# Patient Record
Sex: FEMALE | Race: WHITE | Employment: FULL TIME | URBAN - METROPOLITAN AREA
[De-identification: names, ages, dates, MRNs, and addresses within clinical notes are randomized per-mention and may not be internally consistent; named-entity substitution may affect disease eponyms.]

---

## 2018-11-19 ENCOUNTER — OFFICE VISIT (OUTPATIENT)
Dept: NEUROLOGY | Age: 23
End: 2018-11-19

## 2018-11-19 VITALS
SYSTOLIC BLOOD PRESSURE: 118 MMHG | DIASTOLIC BLOOD PRESSURE: 76 MMHG | BODY MASS INDEX: 22.66 KG/M2 | HEIGHT: 61 IN | WEIGHT: 120 LBS | HEART RATE: 81 BPM | OXYGEN SATURATION: 98 %

## 2018-11-19 DIAGNOSIS — R29.2 HYPERREFLEXIA OF LOWER EXTREMITY: Primary | ICD-10-CM

## 2018-11-19 RX ORDER — PAROXETINE 10 MG/1
TABLET, FILM COATED ORAL
Qty: 30 TAB | Refills: 5 | Status: SHIPPED | OUTPATIENT
Start: 2018-11-19 | End: 2019-10-10

## 2018-11-19 NOTE — PATIENT INSTRUCTIONS
Call us in 30 days and let us know how you are doing A Healthy Lifestyle: Care Instructions Your Care Instructions A healthy lifestyle can help you feel good, stay at a healthy weight, and have plenty of energy for both work and play. A healthy lifestyle is something you can share with your whole family. A healthy lifestyle also can lower your risk for serious health problems, such as high blood pressure, heart disease, and diabetes. You can follow a few steps listed below to improve your health and the health of your family. Follow-up care is a key part of your treatment and safety. Be sure to make and go to all appointments, and call your doctor if you are having problems. It's also a good idea to know your test results and keep a list of the medicines you take. How can you care for yourself at home? · Do not eat too much sugar, fat, or fast foods. You can still have dessert and treats now and then. The goal is moderation. · Start small to improve your eating habits. Pay attention to portion sizes, drink less juice and soda pop, and eat more fruits and vegetables. ? Eat a healthy amount of food. A 3-ounce serving of meat, for example, is about the size of a deck of cards. Fill the rest of your plate with vegetables and whole grains. ? Limit the amount of soda and sports drinks you have every day. Drink more water when you are thirsty. ? Eat at least 5 servings of fruits and vegetables every day. It may seem like a lot, but it is not hard to reach this goal. A serving or helping is 1 piece of fruit, 1 cup of vegetables, or 2 cups of leafy, raw vegetables. Have an apple or some carrot sticks as an afternoon snack instead of a candy bar. Try to have fruits and/or vegetables at every meal. 
· Make exercise part of your daily routine. You may want to start with simple activities, such as walking, bicycling, or slow swimming.  Try to be active 30 to 60 minutes every day. You do not need to do all 30 to 60 minutes all at once. For example, you can exercise 3 times a day for 10 or 20 minutes. Moderate exercise is safe for most people, but it is always a good idea to talk to your doctor before starting an exercise program. 
· Keep moving. Wilfred Wendi the lawn, work in the garden, or [employer] [place]. Take the stairs instead of the elevator at work. · If you smoke, quit. People who smoke have an increased risk for heart attack, stroke, cancer, and other lung illnesses. Quitting is hard, but there are ways to boost your chance of quitting tobacco for good. ? Use nicotine gum, patches, or lozenges. ? Ask your doctor about stop-smoking programs and medicines. ? Keep trying. In addition to reducing your risk of diseases in the future, you will notice some benefits soon after you stop using tobacco. If you have shortness of breath or asthma symptoms, they will likely get better within a few weeks after you quit. · Limit how much alcohol you drink. Moderate amounts of alcohol (up to 2 drinks a day for men, 1 drink a day for women) are okay. But drinking too much can lead to liver problems, high blood pressure, and other health problems. Family health If you have a family, there are many things you can do together to improve your health. · Eat meals together as a family as often as possible. · Eat healthy foods. This includes fruits, vegetables, lean meats and dairy, and whole grains. · Include your family in your fitness plan. Most people think of activities such as jogging or tennis as the way to fitness, but there are many ways you and your family can be more active. Anything that makes you breathe hard and gets your heart pumping is exercise. Here are some tips: 
? Walk to do errands or to take your child to school or the bus. 
? Go for a family bike ride after dinner instead of watching TV. Where can you learn more? Go to http://cuba-navdeep.info/. Enter R529 in the search box to learn more about \"A Healthy Lifestyle: Care Instructions. \" Current as of: December 7, 2017 Content Version: 11.8 © 4844-7357 [employer]. Care instructions adapted under license by [employer] (which disclaims liability or warranty for this information). If you have questions about a medical condition or this instruction, always ask your healthcare professional. [address] any warranty or liability for your use of this information. Office Policies 
o Phone calls/patient messages: Please allow up to 24 hours for someone in the office to contact you about your call or message. Be mindful your provider may be out of the office or your message may require further review. We encourage you to use [employer] for your messages as this is a faster, more efficient way to communicate with our officeo Medication Refills: 
Prescription medications require up to 48 business hours to process. We encourage you to use [employer] for your refills. For controlled medications: Please allow up to 72 business hours to process. Certain medications may require you to  a written prescription at our office. NO narcotic/controlled medications will be prescribed after 4pm Monday through Friday or on weekendso Form/Paperwork Completion: 
Please note there is a $25 fee for all paperwork completed by our providers. We ask that you allow 7-14 business days. Pre-payment is due prior to picking up/faxing the completed form. You may also download your forms to [employer] to have your doctor print off. 
o Test Results: In order for a patient to obtain test results, an appointment must be made with the physician to review the results. Test results cannot be discussed over the phone.

## 2018-11-19 NOTE — PROGRESS NOTES
HISTORY OF PRESENT ILLNESS Bam Hernandez is a 26 y.o. female. This patient is a 82-year-old right-handed  female who comes in today for numbness and tingling on the left side of her body. She states this been going on for several months. She does admit to being an anxious person. She has a small child. She denies any bowel or bladder complaints. She denies any visual complaints. The numbness and tingling can be on the left side of her face or the left arm or the left leg. She denies any episodes of visual loss. She states she occasionally has some chest pain. She is on oral contraceptives. She does have a history of an anxiety disorder for about 10 years but currently is not taking any medication for it. She also has migraine but does not take any prescription drugs for that as it is not a big problem currently. Review of Systems Constitutional:  
     Review of systems is positive for anxiety, chest pain, depression, diarrhea, fatigue, frequent headaches, muscle pain, nausea and vomiting, poor appetite, tinnitus, skipped cardiac beats and occasional stomach pain. Complete review of systems done all others negative Physical Exam 
Constitutional: Oriented to person, place, and time, appears well-developed and well-nourished. No distress. HENT:  
Head: Normocephalic and atraumatic. Mouth/Throat: Oropharynx is clear and moist. No oropharyngeal exudate. Eyes: Conjunctivae and EOM are normal. Pupils are equal, round, and reactive to light. No scleral icterus. Neck: Normal range of motion. Neck supple. No thyromegaly present. Cardiovascular: Normal rate, regular rhythm and normal heart sounds. Musculoskeletal: Normal range of motion, exhibits no edema, tenderness or deformity. Lymphadenopathy: no cervical adenopathy. Neurological: Alert and oriented to person, place, and time.   
Normal strength, she is hyperreflexic at the left knee and left ankle, the left toe does appear to be downgoing Displays no atrophy and no tremor. No cranial nerve deficit or sensory deficit. Exhibits normal muscle tone. Displays a negative Romberg sign, no seizure activity. Coordination normal, gait normal.  
No Babinski's sign on the right side. No Babinski's sign on the left side. Speech, language and mentation are normal 
Visual fields are full to confrontation, funduscopic exam reveals flat discs, the retina and vasculature are normal  
Skin: Skin is warm and dry. No rash noted, not diaphoretic. No erythema. Psychiatric: Normal mood and affect,  behavior is normal. Judgment and thought content normal.  
Vitals reviewed. ASSESSMENT and PLAN 
LEFT BODY SENSORY SYMPTOMS WITH CORRESPONDING HYPERREFLEXIA I suspect most of this patient's complaints are related to her anxiety disorder however given the fact that she has hyperreflexia on the left which is the same side as her symptoms I see no way around getting an MRI scan of her brain to rule out the possibility of demyelinating disease as she is female 26years old and status post a pregnancy all of which put her in the higher risk category for demyelinating disease. In the meantime I am going to treat this as if it is just an anxiety disorder with anxiety symptoms. I will start her on Paxil 5 mg a day and then increase her after 30 days up to 10 mg a day. I will plan to see her back in 4 months. I have asked her to call us after scans are complete providing they are authorized by the insurance company so that she may have results.

## 2018-11-29 ENCOUNTER — HOSPITAL ENCOUNTER (OUTPATIENT)
Dept: MRI IMAGING | Age: 23
Discharge: HOME | End: 2018-11-29
Attending: PSYCHIATRY & NEUROLOGY
Payer: MEDICAID

## 2018-11-29 DIAGNOSIS — R29.2 HYPERREFLEXIA OF LOWER EXTREMITY: ICD-10-CM

## 2018-11-29 PROCEDURE — 70553 MRI BRAIN STEM W/O & W/DYE: CPT

## 2018-11-29 PROCEDURE — A9575 INJ GADOTERATE MEGLUMI 0.1ML: HCPCS | Performed by: PSYCHIATRY & NEUROLOGY

## 2018-11-29 PROCEDURE — 74011250636 HC RX REV CODE- 250/636: Performed by: PSYCHIATRY & NEUROLOGY

## 2018-11-29 RX ORDER — GADOTERATE MEGLUMINE 376.9 MG/ML
10 INJECTION INTRAVENOUS
Status: COMPLETED | OUTPATIENT
Start: 2018-11-29 | End: 2018-11-29

## 2018-11-29 RX ADMIN — GADOTERATE MEGLUMINE 10 ML: 376.9 INJECTION INTRAVENOUS at 17:59

## 2019-03-11 ENCOUNTER — OFFICE VISIT (OUTPATIENT)
Dept: NEUROLOGY | Age: 24
End: 2019-03-11

## 2019-03-11 VITALS
DIASTOLIC BLOOD PRESSURE: 62 MMHG | BODY MASS INDEX: 22.67 KG/M2 | HEART RATE: 94 BPM | SYSTOLIC BLOOD PRESSURE: 130 MMHG | RESPIRATION RATE: 94 BRPM | HEIGHT: 61 IN

## 2019-03-11 DIAGNOSIS — G43.709 CHRONIC MIGRAINE WITHOUT AURA WITHOUT STATUS MIGRAINOSUS, NOT INTRACTABLE: Primary | ICD-10-CM

## 2019-03-11 RX ORDER — BUTALBITAL, ACETAMINOPHEN AND CAFFEINE 50; 325; 40 MG/1; MG/1; MG/1
1 TABLET ORAL
Qty: 40 TAB | Refills: 5 | Status: SHIPPED | OUTPATIENT
Start: 2019-03-11 | End: 2019-09-03 | Stop reason: ALTCHOICE

## 2019-03-11 NOTE — PROGRESS NOTES
I may need to no idea what HISTORY OF PRESENT ILLNESS  Raffi Calderón is a 27 y.o. female. This patient is a 68-year-old right-handed  female who comes in today for numbness and tingling on the left side of her body. She states this been going on for several months. She does admit to being an anxious person. She has a 2 small children. She denies any bowel or bladder complaints. She denies any visual complaints. The numbness and tingling can be on the left side of her face or the left arm or the left leg. She denies any episodes of visual loss. She states she occasionally has some chest pain. She is on oral contraceptives. She does have a history of an anxiety disorder for about 10 years but currently is not taking any medication for it. She also has migraine but does not take any prescription drugs for that as it is not a big problem currently. She is looking for something to take that is not particularly strong and that she does not have to worry about becoming pregnant on, she states that the prophylactic medicine she took in the past did not settle well with her. Follow Up Chronic Condition         Review of Systems   Constitutional:        Review of systems is positive for anxiety, chest pain, depression, diarrhea, fatigue, frequent headaches, muscle pain, nausea and vomiting, poor appetite, tinnitus, skipped cardiac beats and occasional stomach pain. Complete review of systems done all others negative       Physical Exam  Constitutional: Oriented to person, place, and time, appears well-developed and well-nourished. No distress. Musculoskeletal: Normal range of motion, exhibits no edema, tenderness or deformity. Lymphadenopathy: no cervical adenopathy. Neurological: Alert and oriented to person, place, and time.    Normal strength, she is hyperreflexic at the left knee and left ankle, the left toe does appear to be downgoing  Speech, language and mentation are normal  Visual fields are full to confrontation, funduscopic exam reveals flat discs, the retina and vasculature are normal   Skin: Skin is warm and dry. No rash noted, not diaphoretic. No erythema. Psychiatric: Normal mood and affect,  behavior is normal. Judgment and thought content normal.   Vitals reviewed. ASSESSMENT and PLAN  MIGRAINE  Give her some Fioricet to take, she does not want anything that is harmful for child in the breast milk. I told her that the best I had was a class C drug,Fioricet, she  will go ahead and try some back. LEFT BODY SENSORY SYMPTOMS WITH CORRESPONDING HYPERREFLEXIA  This lady's MRI scan of her brain was normal.  I explained her that she had nothing to explain her left body symptoms but I did not think they were going to turn into anything else. She has been treated for anxiety by her OB/GYN. I see no reason to pursue those symptoms any further at this time    This note will not be viewable in MyChart.

## 2019-09-03 ENCOUNTER — OFFICE VISIT (OUTPATIENT)
Dept: NEUROLOGY | Age: 24
End: 2019-09-03

## 2019-09-03 VITALS
WEIGHT: 115 LBS | HEART RATE: 82 BPM | BODY MASS INDEX: 21.71 KG/M2 | HEIGHT: 61 IN | SYSTOLIC BLOOD PRESSURE: 110 MMHG | OXYGEN SATURATION: 98 % | DIASTOLIC BLOOD PRESSURE: 58 MMHG

## 2019-09-03 DIAGNOSIS — F41.9 ANXIETY: ICD-10-CM

## 2019-09-03 DIAGNOSIS — G43.009 MIGRAINE WITHOUT AURA AND WITHOUT STATUS MIGRAINOSUS, NOT INTRACTABLE: ICD-10-CM

## 2019-09-03 DIAGNOSIS — R20.0 LEFT SIDED NUMBNESS: ICD-10-CM

## 2019-09-03 DIAGNOSIS — M54.2 NECK PAIN: Primary | ICD-10-CM

## 2019-09-03 NOTE — PROGRESS NOTES
Neurology follow-up note    Chief Complaint   Patient presents with    Follow-up     migraine neck pain       SUBJECTIVE  Tracie Talley is a 27 y.o. female who presented to the neurology office for management of neck pain. The patient was being seen by Dr. Sarah Woods. Patient does have migraines and has 5-6 headaches a month for which she takes Tylenol and that usually helps her. She denies any smoking, coffee or any tea consumption. She drinks 1-2 sodas every other day and drinks around 4 glasses of water every day. She does have anxiety and hence he has problems sleeping at night. She goes to bed somewhere between 2 and 3 in the morning and wakes up between 8 and 9 in the morning. She was also having left-sided numbness for which she had MRI of the brain and that was normal.    The patient is complaining of neck pain now. She states that the symptoms started in middle of 2018. She has 2-3 episodes of neck pain every week. It is around 7 and 8 out of 10 in severity and lasts for around 30 minutes. She takes Tylenol and that helps her. She has not been in any motor vehicle accident. She does not complain of any radiation to the arms. The patient has not undergone any physical therapy. She does also have significant anxiety for which she takes the Paxil as needed. Current Outpatient Medications   Medication Sig    PARoxetine (PAXIL) 10 mg tablet 1/2 tab po q day for 30 days then 1 po q day    butalbital-acetaminophen-caffeine (FIORICET, ESGIC) -40 mg per tablet Take 1 Tab by mouth every six (6) hours as needed for Pain. This medication is only to be filled in one month intervals     No current facility-administered medications for this visit.         Past Medical History:   Diagnosis Date    FH: mental illness     Headache     Mental disorder      Past Surgical History:   Procedure Laterality Date    HX GYN       Family History   Problem Relation Age of Onset    Headache Mother     Stroke Paternal Grandfather      Social History     Tobacco Use    Smoking status: Former Smoker    Smokeless tobacco: Never Used   Substance Use Topics    Alcohol use: No     Frequency: Never    Drug use: Not on file       REVIEW OF SYSTEMS:   A ten system review of constitutional, cardiovascular, respiratory, musculoskeletal, endocrine, skin, SHEENT, genitourinary, psychiatric and neurologic systems was obtained and is unremarkable except neck pain    EXAMINATION:   Visit Vitals  /58   Pulse 82   Ht 5' 1\" (1.549 m)   Wt 115 lb (52.2 kg)   SpO2 98%   BMI 21.73 kg/m²        General:   General appearance: Pt is in no acute distress   Distal pulses are preserved    Neurological Examination:   Mental Status: AAO x3. Speech is fluent. Follows commands, has normal fund of knowledge, attention, short term recall, comprehension and insight. Cranial Nerves: Visual fields are full. PERRL, Extraocular movements are full. Facial sensation intact. Facial movement intact. Hearing intact to conversation. Palate elevates symmetrically. Shoulder shrug symmetric. Tongue midline. Motor: Strength is 5/5 in all 4 ext. Sensation: Light touch - Normal    Coordination/Cerebellar: Intact to finger-nose-finger     Skin: No significant bruising or lacerations.     Laboratory review:   Results for orders placed or performed during the hospital encounter of 12/21/14   CULTURE, URINE   Result Value Ref Range    Special Requests: NO SPECIAL REQUESTS      [place] Count <1,000 COLONIES/mL      Culture result: NO GROWTH 2 DAYS     CHLAMYDIA/GC AMPLIFIED   Result Value Ref Range    Sample type SWAB      Source ENDOCERVICAL      Chlamydia amplified POSITIVE (A) NEG      N. gonorrhea, amplified NEGATIVE  NEG      Comment (NOTE)    KOH, OTHER SOURCES   Result Value Ref Range    Special Requests: NO SPECIAL REQUESTS      KOH NO YEAST SEEN     WET PREP   Result Value Ref Range    Clue cells CLUE CELLS ABSENT      Wet prep NO TRICHOMONAS SEEN     URINALYSIS W/ REFLEX CULTURE   Result Value Ref Range    Color YELLOW/STRAW      Appearance CLOUDY (A) CLEAR      Specific gravity 1.024 1.003 - 1.030      pH (UA) 8.0 5.0 - 8.0      Protein NEGATIVE  NEG mg/dL    Glucose NEGATIVE  NEG mg/dL    Ketone NEGATIVE  NEG mg/dL    Bilirubin NEGATIVE  NEG      Blood NEGATIVE  NEG      Urobilinogen 1.0 0.2 - 1.0 EU/dL    Nitrites NEGATIVE  NEG      Leukocyte Esterase NEGATIVE  NEG      UA:UC IF INDICATED URINE CULTURE ORDERED (A) CNI      WBC 0-4 0 - 4 /hpf    RBC 0-5 0 - 5 /hpf    Epithelial cells MODERATE (A) FEW /lpf    Bacteria 1+ (A) NEG /hpf    Hyaline cast 0-2 0 - 2   CBC WITH AUTOMATED DIFF   Result Value Ref Range    WBC 10.4 (H) 4.2 - 9.4 K/uL    RBC 4.35 3.93 - 4.90 M/uL    HGB 13.5 (H) 10.8 - 13.3 g/dL    HCT 39.7 33.4 - 40.4 %    MCV 91.3 (H) 76.9 - 90.6 FL    MCH 31.0 (H) 24.8 - 30.2 PG    MCHC 34.0 31.5 - 34.2 g/dL    RDW 12.8 12.3 - 14.6 %    PLATELET 770 287 - 585 K/uL    NEUTROPHILS 63 39 - 74 %    LYMPHOCYTES 29 18 - 50 %    MONOCYTES 7 4 - 11 %    EOSINOPHILS 1 0 - 3 %    BASOPHILS 0 0 - 1 %    ABS. NEUTROPHILS 6.5 1.8 - 7.5 K/UL    ABS. LYMPHOCYTES 3.1 1.2 - 3.3 K/UL    ABS. MONOCYTES 0.7 0.2 - 0.7 K/UL    ABS. EOSINOPHILS 0.1 0.0 - 0.3 K/UL    ABS. BASOPHILS 0.0 0.0 - 0.1 K/UL   HCG URINE, QL. - POC   Result Value Ref Range    Pregnancy test,urine (POC) NEGATIVE  NEG         Imaging review:  11/29/2018  MRI brain with and without contrast  Normal    Documentation review:  I requested records from [employer] [facility] [employer] providers in preparation for my face-to-face visit with her today regarding her presenting complaint. I spent 35 minutes performing a non-face-to-face review of these records and they are summarized below. I reviewed Dr. Lillie Washington note from 3/11/2019. The patient came in today for numbness and tingling on the left side of her body. States that this is been going on for several months.   She does have anxiousness as well. No bladder or bowel incontinence or any visual symptoms. No sensory loss. On examination she has hyperreflexia at the left knee and left ankle. The left toe appears to be downgoing. Based on assessment and plan, patient does have migraine and she is taking Fioricet as needed. The patient does have left sensory symptoms with hyperreflexia and MRI of the brain was normal.    Assessment/Plan:   Albert Coello is a 27 y.o. female who presented to the neurology office for neck pain. This has been going on for the last 1 year. Her examination is normal.  I do suspect her neck pain is musculoskeletal in origin. We will proceed with physical therapy and EMG/nerve conduction study. The patient is also have significant anxiety and was having left-sided numbness which has gotten significantly better. She takes Paxil as needed. The patient does also have episodic migraine 5-6 times a month for which she takes Tylenol. - EMG/nerve conduction study for cervical radiculopathy  - Physical therapy at [facility] physical therapy in [place]  - Follow-up in 3 months    3 most recent PHQ Screens 3/11/2019   Little interest or pleasure in doing things Not at all   Feeling down, depressed, irritable, or hopeless Not at all   Total Score PHQ 2 0     Primary care to address possible depression if PHQ-9 score is more than 9. ICD-10-CM ICD-9-CM    1. Neck pain M54.2 723.1 EMG LIMITED      REFERRAL TO PHYSICAL THERAPY   2. Migraine without aura and without status migrainosus, not intractable G43.009 346.10    3. Left sided numbness R20.0 782.0    4. Anxiety F41.9 300.00         Liam Briseno MD  Neurologist    CC: Marilou Lewis MD  Fax: [phone]    This note was created using voice recognition software. Despite editing, there may be syntax errors.

## 2019-09-03 NOTE — PATIENT INSTRUCTIONS
Office Policies  · Phone calls/patient messages:  Please allow up to 24 hours for someone in the office to contact you about your call or message. Be mindful your provider may be out of the office or your message may require further review. We encourage you to use [employer] for your messages as this is a faster, more efficient way to communicate with our office  · Medication Refills:  Prescription medications require up to 48 business hours to process. We encourage you to use [employer] for your refills. For controlled medications: Please allow up to 72 business hours to process. Certain medications may require you to  a written prescription at our office. NO narcotic/controlled medications will be prescribed after 4pm Monday through Friday or on weekends  · Form/Paperwork Completion:  Please note there is a $25 fee for all paperwork completed by our providers. We ask that you allow 7-14 business days. Pre-payment is due prior to picking up/faxing the completed form. You may also download your forms to [employer] to have your doctor print off.

## 2019-09-04 ENCOUNTER — DOCUMENTATION ONLY (OUTPATIENT)
Dept: NEUROLOGY | Age: 24
End: 2019-09-04

## 2019-10-10 ENCOUNTER — OFFICE VISIT (OUTPATIENT)
Dept: NEUROLOGY | Age: 24
End: 2019-10-10

## 2019-10-10 DIAGNOSIS — F41.9 ANXIETY: Primary | ICD-10-CM

## 2019-10-10 RX ORDER — PAROXETINE HYDROCHLORIDE 20 MG/1
TABLET, FILM COATED ORAL
Qty: 30 TAB | Refills: 5 | Status: SHIPPED | OUTPATIENT
Start: 2019-10-10

## 2019-10-10 NOTE — PROCEDURES
ELECTRODIAGNOSTIC REPORT      Test Date:  10/10/2019    Patient: Onel Liu : 1995 Physician: Waleska Edgar M.D.   ID#: [id number] SEX: Female Ref. Phys: Waleska Edgar M.D. Patient History / Exam:  b/l ue    EMG & NCV Findings:  Evaluation of the left median motor and the right median motor nerves showed normal distal onset latency (L3.5, R3.4 ms), normal amplitude (L6.9, R7.2 mV), and normal conduction velocity (Elbow-Wrist, L73, R68 m/s). The left ulnar motor and the right ulnar motor nerves showed normal distal onset latency (L2.3, R2.6 ms), normal amplitude (L10.8, R10.1 mV), normal conduction velocity (B Elbow-Wrist, L61, R66 m/s), and normal conduction velocity (A Elbow-B Elbow, L91, R83 m/s). The left median sensory, the right median sensory, the left radial sensory, the right radial sensory, the left ulnar sensory, and the right ulnar sensory nerves showed normal distal peak latency (L3.4, R3.3, L2.3, R2.1, L3.3, R3.1 ms) and normal amplitude (L56.4, R98.5, L92.5, R88.6, L106.2, R107.8 µV). All left vs. right side differences were within normal limits. All F Wave latencies were within normal limits. All F Wave left vs. right side latency differences were within normal limits. All examined muscles (as indicated in the following table) showed no evidence of electrical instability. Impression: This is a normal study. There is no electrophysiological evidence of a cervical radiculopathy or entrapment neuropathy in either of the upper extremities. ___________________________  S.  Cindy Silva M.D.    Nerve Conduction Studies  Anti Sensory Summary Table     Stim Site NR Peak (ms) Norm Peak (ms) P-T Amp (µV) Norm P-T Amp Site1 Site2 Dist (cm)   Left Median Anti Sensory (2nd Digit)  30.3°C   Wrist    3.4 <4 56.4 >13 Wrist 2nd Digit 14.0   Right Median Anti Sensory (2nd Digit)  32.8°C   Wrist    3.3 <4 98.5 >13 Wrist 2nd Digit 14.0   Left Radial Anti Sensory (Base 1st Digit) 28.8°C   Wrist    2.3 <2.8 92.5 >11 Wrist Base 1st Digit 10.0   Right Radial Anti Sensory (Base 1st Digit)  32.9°C   Wrist    2.1 <2.8 88.6 >11 Wrist Base 1st Digit 10.0   Left Ulnar Anti Sensory (5th Digit)  31.8°C   Wrist    3.3 <4.0 106.2 >9 Wrist 5th Digit 14.0   Right Ulnar Anti Sensory (5th Digit)  32.6°C   Wrist    3.1 <4.0 107.8 >9 Wrist 5th Digit 14.0     Motor Summary Table     Stim Site NR Onset (ms) Norm Onset (ms) O-P Amp (mV) Norm O-P Amp P-T Amp (mV) Site1 Site2 Dist (cm) Jorge (m/s)   Left Median Motor (Abd Poll Brev)  32.8°C   Wrist    3.5 <4.5 6.9 >4.1 11.4 Wrist Abd Poll Brev 8.0    Elbow    6.5  6.8  11.0 Elbow Wrist 22.0 73   Right Median Motor (Abd Poll Brev)  30.5°C   Wrist    3.4 <4.5 7.2 >4.1 11.6 Wrist Abd Poll Brev 8.0    Elbow    6.5  6.9  11.2 Elbow Wrist 21.0 68   Left Ulnar Motor (Abd Dig Minimi)  32.1°C   Wrist    2.3 <3.7 10.8 >7.9 14.0 Wrist Abd Dig Minimi 8.0    B Elbow    5.4  10.8  14.0 B Elbow Wrist 19.0 61   A Elbow    6.5  10.8  13.9 A Elbow B Elbow 10.0 91   Right Ulnar Motor (Abd Dig Minimi)  31.9°C   Wrist    2.6 <3.7 10.1 >7.9 15.5 Wrist Abd Dig Minimi 8.0    B Elbow    5.5  9.9  15.2 B Elbow Wrist 19.0 66   A Elbow    6.7  9.4  15.4 A Elbow B Elbow 10.0 83     F Wave Studies     NR F-Lat (ms) Lat Norm (ms) L-R F-Lat (ms) L-R Lat Norm   Left Ulnar (Mrkrs) (Abd Dig Min)  32.4°C      23.36 <32 0.00 <2.5   Right Ulnar (Mrkrs) (Abd Dig Min)  32.4°C      23.36 <32 0.00 <2.5       EMG     Side Muscle Nerve Root Ins Act Fibs Psw Recrt Duration Amp Poly Comment   Left 1stDorInt Ulnar C8-T1 Nml Nml Nml Nml Nml Nml Nml    Left PronatorTeres Median C6-7 Nml Nml Nml Nml Nml Nml Nml    Left Biceps Musculocut C5-6 Nml Nml Nml Nml Nml Nml Nml    Left Triceps Radial C6-7-8 Nml Nml Nml Nml Nml Nml Nml    Left Deltoid Axillary C5-6 Nml Nml Nml Nml Nml Nml Nml    Left Lower Cerv Parasp Rami C7,T1 Nml Nml Nml Nml Nml Nml Nml    Right 1stDorInt Ulnar C8-T1 Nml Nml Nml Nml Nml Nml Nml    Right PronatorTeres Median C6-7 Nml Nml Nml Nml Nml Nml Nml    Right Biceps Musculocut C5-6 Nml Nml Nml Nml Nml Nml Nml    Right Triceps Radial C6-7-8 Nml Nml Nml Nml Nml Nml Nml    Right Deltoid Axillary C5-6 Nml Nml Nml Nml Nml Nml Nml    Right Upper Cerv Parasp Rami C3,4 Nml Nml Nml Nml Nml Nml Nml      Waveforms: